# Patient Record
Sex: MALE | Race: BLACK OR AFRICAN AMERICAN | Employment: UNEMPLOYED | URBAN - METROPOLITAN AREA
[De-identification: names, ages, dates, MRNs, and addresses within clinical notes are randomized per-mention and may not be internally consistent; named-entity substitution may affect disease eponyms.]

---

## 2021-01-10 ENCOUNTER — HOSPITAL ENCOUNTER (EMERGENCY)
Age: 56
Discharge: HOME | End: 2021-01-10
Attending: EMERGENCY MEDICINE
Payer: MEDICAID

## 2021-01-10 ENCOUNTER — APPOINTMENT (OUTPATIENT)
Dept: CT IMAGING | Age: 56
End: 2021-01-10
Attending: NURSE PRACTITIONER
Payer: MEDICAID

## 2021-01-10 VITALS
BODY MASS INDEX: 25.76 KG/M2 | DIASTOLIC BLOOD PRESSURE: 90 MMHG | OXYGEN SATURATION: 100 % | SYSTOLIC BLOOD PRESSURE: 138 MMHG | WEIGHT: 170 LBS | TEMPERATURE: 98.9 F | HEART RATE: 88 BPM | HEIGHT: 68 IN | RESPIRATION RATE: 16 BRPM

## 2021-01-10 DIAGNOSIS — Z72.0 TOBACCO USE: ICD-10-CM

## 2021-01-10 DIAGNOSIS — K12.2 CELLULITIS OF SUBMANDIBULAR REGION: ICD-10-CM

## 2021-01-10 DIAGNOSIS — R03.0 ELEVATED BLOOD PRESSURE READING: Primary | ICD-10-CM

## 2021-01-10 LAB
ALBUMIN SERPL-MCNC: 3.4 G/DL (ref 3.5–5)
ALBUMIN/GLOB SERPL: 0.9 {RATIO} (ref 1.1–2.2)
ALP SERPL-CCNC: 93 U/L (ref 45–117)
ALT SERPL-CCNC: 13 U/L (ref 12–78)
ANION GAP SERPL CALC-SCNC: 4 MMOL/L (ref 5–15)
AST SERPL-CCNC: 13 U/L (ref 15–37)
BASOPHILS # BLD: 0 K/UL (ref 0–0.1)
BASOPHILS NFR BLD: 0 % (ref 0–1)
BILIRUB SERPL-MCNC: 0.2 MG/DL (ref 0.2–1)
BUN SERPL-MCNC: 16 MG/DL (ref 6–20)
BUN/CREAT SERPL: 12 (ref 12–20)
CALCIUM SERPL-MCNC: 8.1 MG/DL (ref 8.5–10.1)
CHLORIDE SERPL-SCNC: 106 MMOL/L (ref 97–108)
CO2 SERPL-SCNC: 30 MMOL/L (ref 21–32)
CREAT SERPL-MCNC: 1.35 MG/DL (ref 0.7–1.3)
DIFFERENTIAL METHOD BLD: ABNORMAL
EOSINOPHIL # BLD: 0.2 K/UL (ref 0–0.4)
EOSINOPHIL NFR BLD: 1 % (ref 0–7)
ERYTHROCYTE [DISTWIDTH] IN BLOOD BY AUTOMATED COUNT: 13.7 % (ref 11.5–14.5)
GLOBULIN SER CALC-MCNC: 3.7 G/DL (ref 2–4)
GLUCOSE SERPL-MCNC: 91 MG/DL (ref 65–100)
HCT VFR BLD AUTO: 43.9 % (ref 36.6–50.3)
HGB BLD-MCNC: 14.9 G/DL (ref 12.1–17)
IMM GRANULOCYTES # BLD AUTO: 0 K/UL (ref 0–0.04)
IMM GRANULOCYTES NFR BLD AUTO: 0 % (ref 0–0.5)
LYMPHOCYTES # BLD: 2.8 K/UL (ref 0.8–3.5)
LYMPHOCYTES NFR BLD: 24 % (ref 12–49)
MCH RBC QN AUTO: 31.5 PG (ref 26–34)
MCHC RBC AUTO-ENTMCNC: 33.9 G/DL (ref 30–36.5)
MCV RBC AUTO: 92.8 FL (ref 80–99)
MONOCYTES # BLD: 0.6 K/UL (ref 0–1)
MONOCYTES NFR BLD: 5 % (ref 5–13)
NEUTS SEG # BLD: 8.2 K/UL (ref 1.8–8)
NEUTS SEG NFR BLD: 70 % (ref 32–75)
NRBC # BLD: 0 K/UL (ref 0–0.01)
NRBC BLD-RTO: 0 PER 100 WBC
PLATELET # BLD AUTO: 233 K/UL (ref 150–400)
PMV BLD AUTO: 10.3 FL (ref 8.9–12.9)
POTASSIUM SERPL-SCNC: 3.9 MMOL/L (ref 3.5–5.1)
PROT SERPL-MCNC: 7.1 G/DL (ref 6.4–8.2)
RBC # BLD AUTO: 4.73 M/UL (ref 4.1–5.7)
SODIUM SERPL-SCNC: 140 MMOL/L (ref 136–145)
WBC # BLD AUTO: 11.8 K/UL (ref 4.1–11.1)

## 2021-01-10 PROCEDURE — 80053 COMPREHEN METABOLIC PANEL: CPT

## 2021-01-10 PROCEDURE — 74011250636 HC RX REV CODE- 250/636: Performed by: NURSE PRACTITIONER

## 2021-01-10 PROCEDURE — 74011000636 HC RX REV CODE- 636: Performed by: EMERGENCY MEDICINE

## 2021-01-10 PROCEDURE — 99283 EMERGENCY DEPT VISIT LOW MDM: CPT

## 2021-01-10 PROCEDURE — 85025 COMPLETE CBC W/AUTO DIFF WBC: CPT

## 2021-01-10 PROCEDURE — 70491 CT SOFT TISSUE NECK W/DYE: CPT

## 2021-01-10 PROCEDURE — 36415 COLL VENOUS BLD VENIPUNCTURE: CPT

## 2021-01-10 RX ORDER — CLINDAMYCIN HYDROCHLORIDE 300 MG/1
300 CAPSULE ORAL 4 TIMES DAILY
Qty: 28 CAP | Refills: 0 | Status: SHIPPED | OUTPATIENT
Start: 2021-01-10 | End: 2021-01-17

## 2021-01-10 RX ADMIN — SODIUM CHLORIDE 1000 ML: 9 INJECTION, SOLUTION INTRAVENOUS at 11:51

## 2021-01-10 RX ADMIN — IOPAMIDOL 100 ML: 612 INJECTION, SOLUTION INTRAVENOUS at 11:46

## 2021-01-10 NOTE — ED PROVIDER NOTES
EMERGENCY DEPARTMENT HISTORY AND PHYSICAL EXAM      Date: 1/10/2021  Patient Name: Phani Benjamin    History of Presenting Illness     Chief Complaint   Patient presents with    Facial Swelling     left side several days       History Provided By: Patient      Additional History (Context): Phani Benjamin is a 48 y.o. male with No significant past medical history who presents with facial swelling. Onset greater than 2 weeks ago. Reports pain starting on the left jaw that has worsened within the last week. Reports swelling has gradually increased. Patient denies fever, chills. Reports history of teeth extraction years ago however no complaints of gum or dental pain. Patient is a smoker. Denies neck pain, throat pain, difficulty swallowing. Patient states at times he has pain when opening his mouth. Patient has been able to tolerate oral intake. PCP: None        Past History     Past Medical History:  Past Medical History:   Diagnosis Date    Depression     Psychosis ([facility] [address]        Past Surgical History:  History reviewed. No pertinent surgical history. Family History:  History reviewed. No pertinent family history. Social History:  Social History     Tobacco Use    Smoking status: Current Every Day Smoker    Smokeless tobacco: Never Used   Substance Use Topics    Alcohol use: Not Currently    Drug use: Yes     Types: Marijuana       Allergies: Allergies   Allergen Reactions    Other Food Hives and Itching     [place] seasoning         Review of Systems   Review of Systems   Constitutional: Negative for chills and fever. HENT: Positive for facial swelling. Negative for congestion, ear pain, rhinorrhea, sore throat, trouble swallowing and voice change. + Jaw pain   Respiratory: Negative for cough and shortness of breath. Cardiovascular: Negative for chest pain and leg swelling. Gastrointestinal: Negative for abdominal pain, constipation, diarrhea, nausea and vomiting. Genitourinary: Negative for dysuria, frequency and urgency. Musculoskeletal: Negative for back pain, gait problem and neck pain. Skin: Negative for wound. Neurological: Negative for dizziness, numbness and headaches. All other systems reviewed and are negative. Physical Exam     Vitals:    01/10/21 1151 01/10/21 1152 01/10/21 1200 01/10/21 1237   BP: (!) 147/85  (!) 138/90    Pulse:       Resp:       Temp:       SpO2:  98% 94% 100%   Weight:       Height:         Physical Exam  Vitals signs and nursing note reviewed. Constitutional:       General: He is not in acute distress. Appearance: He is well-developed. He is not ill-appearing. HENT:      Head: Normocephalic and atraumatic. Comments: Left submandibular swelling with palpable nonmobile mass that is tender to touch     Right Ear: Tympanic membrane, ear canal and external ear normal.      Left Ear: Tympanic membrane, ear canal and external ear normal.      Nose: Nose normal.      Comments: + trismus     Mouth/Throat:      Mouth: Mucous membranes are moist.      Dentition: Abnormal dentition (Missing teeth noted to left lower gum). Pharynx: Oropharynx is clear. No oropharyngeal exudate. Eyes:      Extraocular Movements: Extraocular movements intact. Conjunctiva/sclera: Conjunctivae normal.      Pupils: Pupils are equal, round, and reactive to light. Neck:      Musculoskeletal: Normal range of motion and neck supple. Cardiovascular:      Rate and Rhythm: Normal rate and regular rhythm. Pulses: Normal pulses. Heart sounds: Normal heart sounds. Pulmonary:      Effort: Pulmonary effort is normal.      Breath sounds: Normal breath sounds. Abdominal:      General: Bowel sounds are normal. There is no distension. Palpations: Abdomen is soft. Tenderness: There is no abdominal tenderness. There is no guarding or rebound. Musculoskeletal: Normal range of motion.    Lymphadenopathy:      Cervical: No cervical adenopathy. Skin:     General: Skin is warm and dry. Neurological:      Mental Status: He is alert and oriented to person, place, and time. GCS: GCS eye subscore is 4. GCS verbal subscore is 5. GCS motor subscore is 6. Cranial Nerves: No cranial nerve deficit. Psychiatric:         Thought Content: Thought content normal.           Diagnostic Study Results     Labs -     Recent Results (from the past 12 hour(s))   METABOLIC PANEL, COMPREHENSIVE    Collection Time: 01/10/21 10:39 AM   Result Value Ref Range    Sodium 140 136 - 145 mmol/L    Potassium 3.9 3.5 - 5.1 mmol/L    Chloride 106 97 - 108 mmol/L    CO2 30 21 - 32 mmol/L    Anion gap 4 (L) 5 - 15 mmol/L    Glucose 91 65 - 100 mg/dL    BUN 16 6 - 20 MG/DL    Creatinine 1.35 (H) 0.70 - 1.30 MG/DL    BUN/Creatinine ratio 12 12 - 20      GFR est AA >60 >60 ml/min/1.73m2    GFR est non-AA 55 (L) >60 ml/min/1.73m2    Calcium 8.1 (L) 8.5 - 10.1 MG/DL    Bilirubin, total 0.2 0.2 - 1.0 MG/DL    ALT (SGPT) 13 12 - 78 U/L    AST (SGOT) 13 (L) 15 - 37 U/L    Alk. phosphatase 93 45 - 117 U/L    Protein, total 7.1 6.4 - 8.2 g/dL    Albumin 3.4 (L) 3.5 - 5.0 g/dL    Globulin 3.7 2.0 - 4.0 g/dL    A-G Ratio 0.9 (L) 1.1 - 2.2     CBC WITH AUTOMATED DIFF    Collection Time: 01/10/21 10:39 AM   Result Value Ref Range    WBC 11.8 (H) 4.1 - 11.1 K/uL    RBC 4.73 4.10 - 5.70 M/uL    HGB 14.9 12.1 - 17.0 g/dL    HCT 43.9 36.6 - 50.3 %    MCV 92.8 80.0 - 99.0 FL    MCH 31.5 26.0 - 34.0 PG    MCHC 33.9 30.0 - 36.5 g/dL    RDW 13.7 11.5 - 14.5 %    PLATELET 453 915 - 438 K/uL    MPV 10.3 8.9 - 12.9 FL    NRBC 0.0 0  WBC    ABSOLUTE NRBC 0.00 0.00 - 0.01 K/uL    NEUTROPHILS 70 32 - 75 %    LYMPHOCYTES 24 12 - 49 %    MONOCYTES 5 5 - 13 %    EOSINOPHILS 1 0 - 7 %    BASOPHILS 0 0 - 1 %    IMMATURE GRANULOCYTES 0 0.0 - 0.5 %    ABS. NEUTROPHILS 8.2 (H) 1.8 - 8.0 K/UL    ABS. LYMPHOCYTES 2.8 0.8 - 3.5 K/UL    ABS. MONOCYTES 0.6 0.0 - 1.0 K/UL    ABS. EOSINOPHILS 0.2 0.0 - 0.4 K/UL    ABS. BASOPHILS 0.0 0.0 - 0.1 K/UL    ABS. IMM. GRANS. 0.0 0.00 - 0.04 K/UL    DF AUTOMATED         Radiologic Studies -   CT NECK SOFT TISSUE W CONT   Final Result   IMPRESSION: Soft tissue mass subjacent to left nipple immediately superior to   left submandibular gland suspicious for adenopathy with eccentric necrotic   component and surrounding soft tissue edema and swelling may reflect adenopathy   or neoplasm with extensive inflammation in the adjacent soft tissues favoring   infectious process. Follow-up recommended if patient clinically improves with   therapy. CT Results  (Last 48 hours)               01/10/21 1146  CT NECK SOFT TISSUE W CONT Final result    Impression:  IMPRESSION: Soft tissue mass subjacent to left nipple immediately superior to   left submandibular gland suspicious for adenopathy with eccentric necrotic   component and surrounding soft tissue edema and swelling may reflect adenopathy   or neoplasm with extensive inflammation in the adjacent soft tissues favoring   infectious process. Follow-up recommended if patient clinically improves with   therapy. Narrative:  EXAM: CT NECK SOFT TISSUE W CONT       INDICATION: L submandibular pain and swelling > 2 weeks ; evaluate mass       COMPARISON: None. CONTRAST: 100 mL of Isovue-300. TECHNIQUE: Multislice helical CT was performed from the mid calvarium to the   aortic arch during uneventful rapid bolus intravenous contrast administration. Contiguous 2.5 mm axial images were reconstructed and lung and soft tissue   windows were generated. Coronal and sagittal reformations were generated. CT   dose reduction was achieved through use of a standardized protocol tailored for   this examination and automatic exposure control for dose modulation.         FINDINGS:       Immediately superior to the left seventh fibular gland there is a 1.8 x 2.4 x   2.3 cm enhancing soft tissue mass with small internal eccentric fluid collection   internally which lies immediately adjacent to the investing fascia. In the soft   tissues surrounding this there is diffuse inflammatory stranding and swelling. There is no other mass or adenopathy identified within the neck. The carotid and jugular vessels enhance normally. The thyroid gland, submandibular salivary glands and parotid glands are   otherwise normal.       No nasopharyngeal, pharyngeal or laryngeal mass is identified. No abnormalities are identified in the visualized portions of the brain or   orbits. The visualized mastoid air cells and paranasal sinuses are clear. The visualized portions of the lung apices are clear. CXR Results  (Last 48 hours)    None            Medical Decision Making   I am the first provider for this patient. I reviewed the vital signs, available nursing notes, past medical history, past surgical history, family history and social history. Vital Signs-Reviewed the patient's vital signs. Records Reviewed: Nursing Notes and Old Medical Records    41-year-old male with complaints of facial swelling exhibiting left submandibular swelling and tenderness with palpable mass. Patient is afebrile. Positive trismus noted on exam.  Patient is able to tolerate secretions. Plan obtain labs for CT of neck to rule out mass or cyst.    The patient was counseled on the dangers of tobacco use, and was advised to quit. Reviewed strategies to maximize success, including removing cigarettes and smoking materials from environment, stress management and support of family/friends     . ED Course:   ED Course as of Manan 10 1259   Sun Manan 10, 2021   1226 CT shows soft tissue mass suspicious for adenopathy with eccentric necrotic  component and surrounding soft tissue edema and swelling may reflect adenopathy or neoplasm with extensive inflammation in the adjacent soft tissues favoring  infectious process. Mild leukocytosis with WBC 11.8. Patient is afebrile. Discussed imaging results and labs with attending Dr. Iman Sorenson whom agrees with plan which is to have patient follow-up with ENT and DC with p.o. antibiotics. Rx clindamycin. Discussed results with patient and he verbalized understanding. CT NECK SOFT TISSUE W CONT [NA]      ED Course User Index  [NA] Roxana Sanabria NP         Disposition:  Discharge     DISCHARGE NOTE:     Pt has been reexamined. Patient has no new complaints, changes, or physical findings. Care plan outlined and precautions discussed. . All of pt's questions and concerns were addressed. Patient was instructed and agrees to follow up with ENT as well as to return to the ED upon further deterioration. Patient is ready to go home. Follow-up Information     Follow up With Specialties Details Why [facility] Otolaryngology Call in 1 day [address] [address] [phone]          Discharge Medication List as of 1/10/2021 12:26 PM      START taking these medications    Details   clindamycin (CLEOCIN) 300 mg capsule Take 1 Cap by mouth four (4) times daily for 7 days. , Normal, Disp-28 Cap, R-0             Provider Notes (Medical Decision Making):   DDx: Abscess versus versus tumor versus wilfred angina  Procedures:  Procedures    Diagnosis     Clinical Impression:   1. Elevated blood pressure reading    2. Tobacco use    3.  Cellulitis of submandibular region

## 2021-01-10 NOTE — ED NOTES
Patient here with c/o jaw swelling. Patient reports swelling to left side jaw/lower face for about a month. Patient states symptoms worsened this last week until at its very worst this morning. Patient denies fevers, denies drainage of any sort. Patient states hx of multiple teeth pulled in left side of mouth. Emergency Department Nursing Plan of Care       The Nursing Plan of Care is developed from the Nursing assessment and Emergency Department Attending provider initial evaluation. The plan of care may be reviewed in the ED Provider note.     The Plan of Care was developed with the following considerations:   Patient / Family readiness to learn indicated by:verbalized understanding  Persons(s) to be included in education: patient  Barriers to Learning/Limitations:No    Signed     Mildred Dawson RN    1/10/2021   10:36 AM

## 2021-03-21 ENCOUNTER — HOSPITAL ENCOUNTER (EMERGENCY)
Age: 56
Discharge: HOME | End: 2021-03-22
Attending: EMERGENCY MEDICINE
Payer: MEDICAID

## 2021-03-21 ENCOUNTER — APPOINTMENT (OUTPATIENT)
Dept: CT IMAGING | Age: 56
End: 2021-03-21
Attending: NURSE PRACTITIONER
Payer: MEDICAID

## 2021-03-21 VITALS
WEIGHT: 176 LBS | SYSTOLIC BLOOD PRESSURE: 181 MMHG | HEIGHT: 67 IN | BODY MASS INDEX: 27.62 KG/M2 | TEMPERATURE: 98.4 F | RESPIRATION RATE: 16 BRPM | OXYGEN SATURATION: 100 % | DIASTOLIC BLOOD PRESSURE: 103 MMHG | HEART RATE: 80 BPM

## 2021-03-21 DIAGNOSIS — R03.0 ELEVATED BLOOD PRESSURE READING: ICD-10-CM

## 2021-03-21 DIAGNOSIS — R22.0 MASS OF LEFT SUBMANDIBULAR REGION: Primary | ICD-10-CM

## 2021-03-21 LAB
ALBUMIN SERPL-MCNC: 3.4 G/DL (ref 3.5–5)
ALBUMIN/GLOB SERPL: 0.9 {RATIO} (ref 1.1–2.2)
ALP SERPL-CCNC: 85 U/L (ref 45–117)
ALT SERPL-CCNC: 11 U/L (ref 12–78)
ANION GAP SERPL CALC-SCNC: 4 MMOL/L (ref 5–15)
AST SERPL-CCNC: 12 U/L (ref 15–37)
BASOPHILS # BLD: 0 K/UL (ref 0–0.1)
BASOPHILS NFR BLD: 0 % (ref 0–1)
BILIRUB SERPL-MCNC: 0.3 MG/DL (ref 0.2–1)
BUN SERPL-MCNC: 17 MG/DL (ref 6–20)
BUN/CREAT SERPL: 9 (ref 12–20)
CALCIUM SERPL-MCNC: 8.7 MG/DL (ref 8.5–10.1)
CHLORIDE SERPL-SCNC: 107 MMOL/L (ref 97–108)
CO2 SERPL-SCNC: 30 MMOL/L (ref 21–32)
CREAT SERPL-MCNC: 1.92 MG/DL (ref 0.7–1.3)
DIFFERENTIAL METHOD BLD: ABNORMAL
EOSINOPHIL # BLD: 0.1 K/UL (ref 0–0.4)
EOSINOPHIL NFR BLD: 1 % (ref 0–7)
ERYTHROCYTE [DISTWIDTH] IN BLOOD BY AUTOMATED COUNT: 14 % (ref 11.5–14.5)
GLOBULIN SER CALC-MCNC: 3.8 G/DL (ref 2–4)
GLUCOSE SERPL-MCNC: 95 MG/DL (ref 65–100)
HCT VFR BLD AUTO: 45.5 % (ref 36.6–50.3)
HGB BLD-MCNC: 15.3 G/DL (ref 12.1–17)
IMM GRANULOCYTES # BLD AUTO: 0.1 K/UL (ref 0–0.04)
IMM GRANULOCYTES NFR BLD AUTO: 1 % (ref 0–0.5)
LYMPHOCYTES # BLD: 3 K/UL (ref 0.8–3.5)
LYMPHOCYTES NFR BLD: 28 % (ref 12–49)
MCH RBC QN AUTO: 31 PG (ref 26–34)
MCHC RBC AUTO-ENTMCNC: 33.6 G/DL (ref 30–36.5)
MCV RBC AUTO: 92.3 FL (ref 80–99)
MONOCYTES # BLD: 0.7 K/UL (ref 0–1)
MONOCYTES NFR BLD: 7 % (ref 5–13)
NEUTS SEG # BLD: 6.8 K/UL (ref 1.8–8)
NEUTS SEG NFR BLD: 63 % (ref 32–75)
NRBC # BLD: 0 K/UL (ref 0–0.01)
NRBC BLD-RTO: 0 PER 100 WBC
PLATELET # BLD AUTO: 242 K/UL (ref 150–400)
PMV BLD AUTO: 9.3 FL (ref 8.9–12.9)
POTASSIUM SERPL-SCNC: 4 MMOL/L (ref 3.5–5.1)
PROT SERPL-MCNC: 7.2 G/DL (ref 6.4–8.2)
RBC # BLD AUTO: 4.93 M/UL (ref 4.1–5.7)
SODIUM SERPL-SCNC: 141 MMOL/L (ref 136–145)
WBC # BLD AUTO: 10.7 K/UL (ref 4.1–11.1)

## 2021-03-21 PROCEDURE — 85025 COMPLETE CBC W/AUTO DIFF WBC: CPT

## 2021-03-21 PROCEDURE — 99282 EMERGENCY DEPT VISIT SF MDM: CPT

## 2021-03-21 PROCEDURE — 70491 CT SOFT TISSUE NECK W/DYE: CPT

## 2021-03-21 PROCEDURE — 80053 COMPREHEN METABOLIC PANEL: CPT

## 2021-03-21 PROCEDURE — 36415 COLL VENOUS BLD VENIPUNCTURE: CPT

## 2021-03-21 PROCEDURE — 74011000636 HC RX REV CODE- 636: Performed by: EMERGENCY MEDICINE

## 2021-03-21 RX ORDER — TRAZODONE HYDROCHLORIDE 50 MG/1
TABLET ORAL
COMMUNITY

## 2021-03-21 RX ORDER — SODIUM CHLORIDE 0.9 % (FLUSH) 0.9 %
10 SYRINGE (ML) INJECTION
Status: COMPLETED | OUTPATIENT
Start: 2021-03-21 | End: 2021-03-21

## 2021-03-21 RX ADMIN — IOPAMIDOL 70 ML: 612 INJECTION, SOLUTION INTRAVENOUS at 23:31

## 2021-03-21 RX ADMIN — Medication 10 ML: at 23:31

## 2021-03-22 RX ORDER — CLONIDINE HYDROCHLORIDE 0.1 MG/1
0.2 TABLET ORAL
Status: DISCONTINUED | OUTPATIENT
Start: 2021-03-22 | End: 2021-03-22

## 2021-03-22 RX ORDER — AMLODIPINE BESYLATE 5 MG/1
5 TABLET ORAL DAILY
Qty: 30 TAB | Refills: 0 | Status: SHIPPED | OUTPATIENT
Start: 2021-03-22

## 2021-03-22 NOTE — ED TRIAGE NOTES
Patient presents to the ED with c/o left sided jaw swelling x1 month. Pt reports being seen and was given an antibiotic. Pt denies any resolution with the medications.

## 2021-03-22 NOTE — ED NOTES
To Hou from radiology reports that pt kidney function has worsened since last visit and he will need to hydrate after CT w/ contrast. Amount of contrast for CT will be decreased

## 2021-03-22 NOTE — ED NOTES
Pt presents with L submandibular swelling/ nodule x 1 month. Pt reports he was seen at [facility] ED 1/10/2021 for the same reason and was prescribed an antibiotic- the swelling did not resolve. Pt reports the nodule has increased in size since his last visit, from \"pea size\" to \"walnut size. \" Pt denies pain when eating or drinking. Alert and oriented x 4. Skin warm, dry, intact. Ambulates independently. Call bell within reach. Emergency Department Nursing Plan of Care       The Nursing Plan of Care is developed from the Nursing assessment and Emergency Department Attending provider initial evaluation. The plan of care may be reviewed in the ED Provider note.     The Plan of Care was developed with the following considerations:   Patient / Family readiness to learn indicated by:verbalized understanding  Persons(s) to be included in education: patient  Barriers to Learning/Limitations:No    Signed     Rebecca Dotson RN    3/21/2021   10:34 PM

## 2021-03-22 NOTE — ED PROVIDER NOTES
EMERGENCY DEPARTMENT HISTORY AND PHYSICAL EXAM    Date: 3/21/2021  Patient Name: Robson Miller    History of Presenting Illness     Chief Complaint   Patient presents with    Jaw Swelling         History Provided By: Patient    Chief Complaint: skin problem  Duration: onset in January   Timing:  Gradual and Worsening  Location: sub mandible left side  Quality: Pressure  Severity: 7 out of 10  Modifying Factors: none  Associated Symptoms: lesion sub mandible       HPI: Robson Miller is a 48 y.o. male with a PMH of No significant past medical history who presents with submandibular lesion onset in January. Patient was evaluated this emergency department on chart review noted patient was treated for cellulitis given clindamycin which he completed. Patient states lesion started out very small and has increased in size since his last visit. Patient did not follow-up with ENT. PCP: None    Current Outpatient Medications   Medication Sig Dispense Refill    amLODIPine (NORVASC) 5 mg tablet Take 1 Tab by mouth daily. 30 Tab 0    traZODone (DESYREL) 50 mg tablet Take  by mouth nightly. Past History     Past Medical History:  Past Medical History:   Diagnosis Date    Depression     Psychosis [facility])        Past Surgical History:  History reviewed. No pertinent surgical history. Family History:  History reviewed. No pertinent family history. Social History:  Social History     Tobacco Use    Smoking status: Current Every Day Smoker     Packs/day: 0.50    Smokeless tobacco: Never Used   Substance Use Topics    Alcohol use: Yes     Comment: occasionally    Drug use: Yes     Types: Marijuana       Allergies: Allergies   Allergen Reactions    Other Food Hives and Itching     [place] seasoning         Review of Systems   Review of Systems   Constitutional: Negative for chills, fatigue and fever. HENT: Negative for congestion and sore throat. Eyes: Negative for redness.    Respiratory: Negative for cough, chest tightness and wheezing. Cardiovascular: Negative for chest pain. Gastrointestinal: Negative for abdominal pain. Genitourinary: Negative for dysuria. Musculoskeletal: Negative for arthralgias, back pain, myalgias, neck pain and neck stiffness. Skin: Negative for rash. Lesion submandibular   Neurological: Negative for dizziness, syncope, weakness, light-headedness, numbness and headaches. Hematological: Negative for adenopathy. Psychiatric/Behavioral: Negative for agitation and behavioral problems. All other systems reviewed and are negative. Physical Exam     Vitals:    03/21/21 2202 03/21/21 2357   BP: (!) 184/104 (!) 181/103   Pulse: 80    Resp: 16    Temp: 98.4 °F (36.9 °C)    SpO2: 100%    Weight: 79.8 kg (176 lb)    Height: 5' 7\" (1.702 m)      Physical Exam  Vitals signs and nursing note reviewed. Constitutional:       Appearance: He is well-developed. HENT:      Head: Normocephalic and atraumatic. Right Ear: External ear normal.   Eyes:      General:         Right eye: No discharge. Left eye: No discharge. Conjunctiva/sclera: Conjunctivae normal.   Neck:      Musculoskeletal: Normal range of motion and neck supple. Cardiovascular:      Rate and Rhythm: Normal rate and regular rhythm. Heart sounds: Normal heart sounds. Pulmonary:      Effort: Pulmonary effort is normal. No respiratory distress. Breath sounds: Normal breath sounds. No wheezing. Abdominal:      General: Bowel sounds are normal.      Palpations: Abdomen is soft. Tenderness: There is no abdominal tenderness. Musculoskeletal: Normal range of motion. Lymphadenopathy:      Cervical: No cervical adenopathy. Skin:     General: Skin is warm and dry. Neurological:      Mental Status: He is alert and oriented to person, place, and time. Cranial Nerves: No cranial nerve deficit.    Psychiatric:         Behavior: Behavior normal.         Thought Content: Thought content normal.         Judgment: Judgment normal.           Diagnostic Study Results     Labs -     No results found for this or any previous visit (from the past 12 hour(s)). Radiologic Studies -   CT NECK SOFT TISSUE W CONT   Final Result      1. New, subcutaneous but necrotic left submandibular mass 2.0 x 2.0 cm could be   a necrotic lymph node or abscess. 2. Slight decrease in size of separate soft tissue mass/lymph node adjacent to   the left submandibular gland. 3. Slight decrease in enlarged left jugulodigastric lymph node. CT Results  (Last 48 hours)               03/21/21 2329  CT NECK SOFT TISSUE W CONT Final result    Impression:      1. New, subcutaneous but necrotic left submandibular mass 2.0 x 2.0 cm could be   a necrotic lymph node or abscess. 2. Slight decrease in size of separate soft tissue mass/lymph node adjacent to   the left submandibular gland. 3. Slight decrease in enlarged left jugulodigastric lymph node. Narrative:  INDICATION: l submandibular mass compare with previous       COMPARISON: January 10, 2021       TECHNIQUE:  Axial neck CT was performed after the uneventful administration of   IV contrast. Sagittal and coronal reconstructions were generated. CT dose reduction was achieved through use of a standardized protocol tailored   for this examination and automatic exposure control for dose modulation. FINDINGS:       NASOPHARYNX: Normal.   SUPRAHYOID NECK: Normal oropharynx, oral cavity, parapharyngeal space, and   retropharyngeal space. INFRAHYOID NECK: Normal larynx, hypopharynx and supraglottis. PAROTID GLANDS: Normal.   SUBMANDIBULAR GLANDS: Normal right submandibular gland. Left sublingual gland   displaced posteriorly slightly, with an adjacent 1.9 x 1.6 cm mass, previously   1.8 x 2.4 cm.  However, there is a new, necrotic appearing 2.0 x 2.0 cm mass in   the left submandibular soft tissues inferior to the submandibular gland, with   associated soft tissue swelling and involvement of the left platysma muscle.   THYROID GLAND: Normal.   LYMPH NODES: Left jugulodigastric lymph node 1.6 x 1.1 cm previously 1.8 x 1.3   cm. Submental lymph nodes 1.3 and 1.0 cm, similar to prior study.   LUNG APICES: Emphysema.   OSSEOUS STRUCTURES: No destructive bone lesion.   ADDITIONAL COMMENTS: N/A.               CXR Results  (Last 48 hours)    None            Medical Decision Making   I am the first provider for this patient.    I reviewed the vital signs, available nursing notes, past medical history, past surgical history, family history and social history.    Vital Signs-Reviewed the patient's vital signs.    Records Reviewed: Nursing Notes    Provider Notes (Medical Decision Making):   DDX abscess versus cellulitis versus cyst versus tumor  5-year-old male presents to the emergency department with increased size of the lesion submandibular left side.  After evaluation in January treated for cellulitis states he did not get better.  Patient is now follow-up with ENT.  CT scan ordered.  ED Course as of Mar 22 1529   Mon Mar 22, 2021   0001 CT report pending .care of patient transferred to attending.    [AN]      ED Course User Index  [AN] Enedina Berry, NP          Disposition:  home    DISCHARGE NOTE:   Patient discharged by attending.  Follow-up Information     Follow up With Specialties Details Why Contact Info    [facility] In 1 week  [address]  [address]  [facility] [address] [phone]    Gorge Nolan MD Otolaryngology Schedule an appointment as soon as possible for a visit  regarding your left submandibular mass [address]  [address]  [facility] [address] [phone]      [facility] Otolaryngology  Schedule an appointment as soon as possible for a visit  regarding submandibular mass [address]   [address]  [facility] [address] [phone]    [facility] EMERGENCY  DEPT Emergency Medicine  As needed, If symptoms worsen [address]          Discharge Medication List as of 3/22/2021 12:16 AM      START taking these medications    Details   amLODIPine (NORVASC) 5 mg tablet Take 1 Tab by mouth daily. , Normal, Disp-30 Tab, R-0         CONTINUE these medications which have NOT CHANGED    Details   traZODone (DESYREL) 50 mg tablet Take  by mouth nightly., Historical Med             Procedures:  Procedures    Please note that this dictation was completed with Dragon, computer voice recognition software. Quite often unanticipated grammatical, syntax, homophones, and other interpretive errors are inadvertently transcribed by the computer software. Please disregard these errors. Additionally, please excuse any errors that have escaped final proofreading. Diagnosis     Clinical Impression:   1. Mass of left submandibular region    2.  Elevated blood pressure reading

## 2021-04-06 ENCOUNTER — TRANSCRIBE ORDER (OUTPATIENT)
Dept: SCHEDULING | Age: 56
End: 2021-04-06

## 2021-04-06 DIAGNOSIS — R22.1 NECK MASS: Primary | ICD-10-CM

## 2021-04-06 DIAGNOSIS — F17.200 SMOKER: ICD-10-CM

## 2021-04-06 DIAGNOSIS — Z00.8 OTHER SPECIFIED GENERAL MEDICAL EXAMINATION: ICD-10-CM

## 2021-04-14 ENCOUNTER — HOSPITAL ENCOUNTER (OUTPATIENT)
Dept: ULTRASOUND IMAGING | Age: 56
Discharge: HOME | End: 2021-04-14
Attending: OTOLARYNGOLOGY
Payer: MEDICAID

## 2021-04-14 DIAGNOSIS — F17.200 SMOKER: ICD-10-CM

## 2021-04-14 DIAGNOSIS — R22.1 NECK MASS: ICD-10-CM

## 2021-04-14 DIAGNOSIS — Z00.8 OTHER SPECIFIED GENERAL MEDICAL EXAMINATION: ICD-10-CM

## 2021-04-14 PROCEDURE — 88305 TISSUE EXAM BY PATHOLOGIST: CPT

## 2021-04-14 PROCEDURE — 87116 MYCOBACTERIA CULTURE: CPT

## 2021-04-14 PROCEDURE — 88173 CYTOPATH EVAL FNA REPORT: CPT

## 2021-04-14 PROCEDURE — 87102 FUNGUS ISOLATION CULTURE: CPT

## 2021-04-14 PROCEDURE — 77030003666 HC NDL SPINAL BD -A

## 2021-04-14 PROCEDURE — 87205 SMEAR GRAM STAIN: CPT

## 2021-04-14 PROCEDURE — 77030040395 HC NDL BIOP COAX INTRO MRTM -B

## 2021-04-14 PROCEDURE — 77030014115

## 2021-04-14 PROCEDURE — 88172 CYTP DX EVAL FNA 1ST EA SITE: CPT

## 2021-04-14 PROCEDURE — 10005 FNA BX W/US GDN 1ST LES: CPT

## 2021-04-18 LAB
BACTERIA SPEC CULT: NORMAL
BACTERIA SPEC CULT: NORMAL
GRAM STN SPEC: NORMAL
GRAM STN SPEC: NORMAL
SERVICE CMNT-IMP: NORMAL

## 2021-05-17 LAB
BACTERIA SPEC CULT: NORMAL
SERVICE CMNT-IMP: NORMAL

## 2021-05-26 ENCOUNTER — TRANSCRIBE ORDER (OUTPATIENT)
Dept: SCHEDULING | Age: 56
End: 2021-05-26

## 2021-05-26 DIAGNOSIS — R22.1 NECK MASS: Primary | ICD-10-CM

## 2021-05-29 LAB
ACID FAST STN SPEC: NEGATIVE
MYCOBACTERIUM SPEC QL CULT: NEGATIVE
SPECIMEN PREPARATION: NORMAL
SPECIMEN SOURCE: NORMAL

## 2021-07-13 ENCOUNTER — HOSPITAL ENCOUNTER (EMERGENCY)
Age: 56
Discharge: HOME | End: 2021-07-14
Attending: EMERGENCY MEDICINE
Payer: MEDICAID

## 2021-07-13 ENCOUNTER — APPOINTMENT (OUTPATIENT)
Dept: GENERAL RADIOLOGY | Age: 56
End: 2021-07-13
Attending: EMERGENCY MEDICINE
Payer: MEDICAID

## 2021-07-13 VITALS
RESPIRATION RATE: 18 BRPM | OXYGEN SATURATION: 95 % | HEART RATE: 102 BPM | TEMPERATURE: 97.7 F | DIASTOLIC BLOOD PRESSURE: 73 MMHG | BODY MASS INDEX: 31.76 KG/M2 | SYSTOLIC BLOOD PRESSURE: 122 MMHG | HEIGHT: 64 IN | WEIGHT: 186 LBS

## 2021-07-13 DIAGNOSIS — J06.9 ACUTE UPPER RESPIRATORY INFECTION: Primary | ICD-10-CM

## 2021-07-13 DIAGNOSIS — Z72.0 TOBACCO ABUSE: ICD-10-CM

## 2021-07-13 DIAGNOSIS — J20.9 ACUTE BRONCHITIS, UNSPECIFIED ORGANISM: ICD-10-CM

## 2021-07-13 PROCEDURE — 71046 X-RAY EXAM CHEST 2 VIEWS: CPT

## 2021-07-13 PROCEDURE — 99283 EMERGENCY DEPT VISIT LOW MDM: CPT

## 2021-07-13 NOTE — LETTER
[address] EMERGENCY DEPT  [address] [facility] [id number] [phone]    Work/School Note    Date: 7/13/2021    To Whom It May concern:    Tony Dubon was seen and treated today in the emergency room by the following provider(s):  Attending Provider: Lashell Messina MD.      Tony Dubon may return to work on 7/15/21.     Sincerely,          Agustina Coleman MD

## 2021-07-14 PROCEDURE — 74011250637 HC RX REV CODE- 250/637: Performed by: EMERGENCY MEDICINE

## 2021-07-14 RX ORDER — GUAIFENESIN 600 MG/1
600 TABLET, EXTENDED RELEASE ORAL ONCE
Status: COMPLETED | OUTPATIENT
Start: 2021-07-14 | End: 2021-07-14

## 2021-07-14 RX ORDER — GUAIFENESIN 600 MG/1
600 TABLET, EXTENDED RELEASE ORAL 2 TIMES DAILY
Qty: 20 TABLET | Refills: 0 | Status: SHIPPED | OUTPATIENT
Start: 2021-07-14

## 2021-07-14 RX ORDER — BENZONATATE 100 MG/1
100 CAPSULE ORAL
Qty: 30 CAPSULE | Refills: 0 | Status: SHIPPED | OUTPATIENT
Start: 2021-07-14 | End: 2021-07-21

## 2021-07-14 RX ORDER — AZITHROMYCIN 500 MG/1
500 TABLET, FILM COATED ORAL
Status: COMPLETED | OUTPATIENT
Start: 2021-07-14 | End: 2021-07-14

## 2021-07-14 RX ORDER — ALBUTEROL SULFATE 90 UG/1
2 AEROSOL, METERED RESPIRATORY (INHALATION)
Qty: 1 INHALER | Refills: 0 | Status: SHIPPED | OUTPATIENT
Start: 2021-07-14

## 2021-07-14 RX ORDER — BENZONATATE 100 MG/1
200 CAPSULE ORAL ONCE
Status: COMPLETED | OUTPATIENT
Start: 2021-07-14 | End: 2021-07-14

## 2021-07-14 RX ORDER — AZITHROMYCIN 250 MG/1
TABLET, FILM COATED ORAL
Qty: 6 TABLET | Refills: 0 | Status: SHIPPED | OUTPATIENT
Start: 2021-07-14 | End: 2021-07-19

## 2021-07-14 RX ADMIN — BENZONATATE 200 MG: 100 CAPSULE ORAL at 00:20

## 2021-07-14 RX ADMIN — AZITHROMYCIN MONOHYDRATE 500 MG: 500 TABLET ORAL at 00:20

## 2021-07-14 RX ADMIN — GUAIFENESIN 600 MG: 600 TABLET, EXTENDED RELEASE ORAL at 00:20

## 2021-07-14 NOTE — ED PROVIDER NOTES
EMERGENCY DEPARTMENT HISTORY AND PHYSICAL EXAM      Please note that this dictation was completed with the assistance of \"Dragon\", the computer voice recognition software. Quite often unanticipated grammatical, syntax, homophones, and other interpretive errors are inadvertently transcribed by the computer software. Please disregard these errors and any errors that have escaped final proofreading. Thank you. Patient Name: Tony Dubon  : 1965  MRN: [id number]  History of Presenting Illness     Chief Complaint   Patient presents with    Cough     History Provided By: Patient    HPI: Tony Dubon, 55 y.o. male with past medical history as documented below presents to the ED with c/o of 3 week of productive cough and nasal congestion. Denies SOB or chest pain. No fevers. No sick contacts or exposure to COVID-19. Pt has been vaccinated. States he has been smoking a pack per day for the past several years and has had to cut back due to above sx's. States taking OTC meds without relief of sx's. Pt denies any other exacerbating or ameliorating factors. Additionally, pt specifically denies any recent fever, chills, headache, nausea, vomiting, abdominal pain, CP, SOB, lightheadedness, dizziness, numbness, weakness, lower extremity swelling, heart palpitations, urinary sxs, diarrhea, constipation, melena, hematochezia. There are no other complaints, changes or physical findings pertinent to the HPI at this time. PCP: None    Past History   Past Medical History:  Past Medical History:   Diagnosis Date    Depression     Psychosis ([facility] [address]        Past Surgical History:  Denies    Family History:  History reviewed. No pertinent family history.     Social History:  Social History     Tobacco Use    Smoking status: Current Every Day Smoker     Packs/day: 0.50    Smokeless tobacco: Never Used   Substance Use Topics    Alcohol use: Yes     Comment: occasionally    Drug use: Yes     Types: Marijuana Allergies: Allergies   Allergen Reactions    Other Food Hives and Itching     [place] seasoning       Current Medications:  No current facility-administered medications on file prior to encounter. Current Outpatient Medications on File Prior to Encounter   Medication Sig Dispense Refill    amLODIPine (NORVASC) 5 mg tablet Take 1 Tab by mouth daily. 30 Tab 0    traZODone (DESYREL) 50 mg tablet Take  by mouth nightly. Review of Systems   Review of Systems   Constitutional: Negative. Negative for chills and fever. HENT: Positive for congestion. Negative for sore throat. Eyes: Negative. Respiratory: Positive for cough. Negative for chest tightness, shortness of breath and wheezing. Cardiovascular: Negative. Negative for chest pain, palpitations and leg swelling. Gastrointestinal: Negative. Negative for abdominal distention, abdominal pain, blood in stool, constipation, diarrhea, nausea and vomiting. Endocrine: Negative. Genitourinary: Negative. Negative for dysuria, flank pain, frequency, hematuria and urgency. Musculoskeletal: Negative. Negative for arthralgias, back pain and myalgias. Skin: Negative. Negative for color change and rash. Neurological: Negative. Negative for dizziness, syncope, speech difficulty, weakness, light-headedness, numbness and headaches. Hematological: Negative. Psychiatric/Behavioral: Negative. Negative for confusion and self-injury. The patient is not nervous/anxious. All other systems reviewed and are negative. Physical Exam   Physical Exam  Vitals and nursing note reviewed. Constitutional:       Appearance: He is well-developed. He is not toxic-appearing. HENT:      Head: Normocephalic and atraumatic. Nose: Congestion present. Mouth/Throat:      Pharynx: No posterior oropharyngeal erythema.    Eyes:      Conjunctiva/sclera: Conjunctivae normal.   Cardiovascular:      Rate and Rhythm: Normal rate and regular rhythm. Heart sounds: Normal heart sounds. No murmur heard. No friction rub. No gallop. Pulmonary:      Effort: Pulmonary effort is normal. No respiratory distress. Breath sounds: Normal breath sounds. No wheezing or rales. Chest:      Chest wall: No tenderness. Abdominal:      General: Bowel sounds are normal. There is no distension. Palpations: Abdomen is soft. There is no mass. Tenderness: There is no abdominal tenderness. There is no guarding or rebound. Musculoskeletal:         General: Normal range of motion. Cervical back: Normal range of motion. Skin:     General: Skin is warm. Neurological:      General: No focal deficit present. Mental Status: He is alert and oriented to person, place, and time. Motor: No abnormal muscle tone. Psychiatric:         Behavior: Behavior is cooperative. Diagnostic Study Results     Labs -   I have personally reviewed and interpreted all available laboratory results. No results found for this or any previous visit (from the past 24 hour(s)). Radiologic Studies -   I have personally reviewed and interpreted all available imaging studies and agree with radiology interpretation and report. XR CHEST PA LAT   Final Result   Clear lungs. CT Results  (Last 48 hours)    None        CXR Results  (Last 48 hours)               07/13/21 6138  XR CHEST PA LAT Final result    Impression:  Clear lungs. Narrative:  PA AND LATERAL CHEST RADIOGRAPHS: 7/13/2021 11:29 PM       INDICATION: Cough for 3 weeks. COMPARISON: None. TECHNIQUE: Frontal and lateral radiographs of the chest.       FINDINGS:   The lungs are clear. The central airways are patent. The heart size is normal.   No pneumothorax or pleural effusion. Medical Decision Making   I reviewed the vital signs, available nursing notes, past medical history, past surgical history, family history and social history.     Vital Signs-Reviewed the patient's vital signs. Patient Vitals for the past 24 hrs:   Temp Pulse Resp BP SpO2   07/13/21 2222 97.7 °F (36.5 °C) (!) 102 18 122/73 95 %     Pulse Oximetry Analysis - 95% on RA    Cardiac Monitor:   Rate: 99 bpm  The cardiac monitor revealed the following rhythm as interpreted by me: Normal Sinus Rhythm      Records Reviewed: Nursing Notes, Old Medical Records, Previous electrocardiograms, Previous Radiology Studies and Previous Laboratory Studies    Provider Notes (Medical Decision Making):   Pt presents with acute URI symptoms including nasal congestion, rhinorrhea. Pt also has c/o of cough without dyspnea, chest pain or wheezing. Pt is well-appearing with stable vitals and benign exam; symptoms are consistent with an uncomplicated URI. DDx: COVID-19, acute bronchitis, bacterial sinusitis vs. pharyngitis, migraine, flu. Symptomatic therapy suggested: acetaminophen, ibuprofen, antihistamine-decongestant of choice, cough suppressant of choice. Increase fluids, use vaporizer, stay in steamy bathroom tid 15 min prn severe cough, tylenol as needed, rest, avoid smoky areas. Lack of antibiotic effectiveness discussed with her. Symptomatic therapy suggested: gargle for sore throat, use mist at bedside for congestion. Apply facial warm packs for sinus pain or use nasal saline sprays. Follow up prn if not better in 72 hours. ED Course:   I am the first provider for this patient's ED visit today. Initial assessment performed. I discussed presenting problems, concerns and my formulated plan for today's visit with the patient and any available family members at bedside. I encouraged them to ask questions as they arise throughout the visit.      Social History     Tobacco Use    Smoking status: Current Every Day Smoker     Packs/day: 0.50    Smokeless tobacco: Never Used   Substance Use Topics    Alcohol use: Yes     Comment: occasionally    Drug use: Yes     Types: Marijuana     TOBACCO COUNSELING:  Upon evaluation, pt expressed that they are a current tobacco user. For approximately 3-5 mins, pt has been counseled on the dangers of smoking and was encouraged to quit as soon as possible in order to decrease further risks to their health. Pt has conveyed their understanding of the risks involved should they continue to use tobacco products. I reviewed our electronic medical record system for any past medical records that were available that may contribute to the patient's current condition, the nursing notes and vital signs from today's visit. ED Orders Placed :  Orders Placed This Encounter    XR CHEST PA LAT    azithromycin (ZITHROMAX) tablet 500 mg    guaiFENesin ER (MUCINEX) tablet 600 mg    benzonatate (TESSALON) capsule 200 mg    azithromycin (Zithromax Z-Rosendo) 250 mg tablet    guaiFENesin ER (Mucinex) 600 mg ER tablet    benzonatate (Tessalon Perles) 100 mg capsule    albuterol (PROVENTIL HFA, VENTOLIN HFA, PROAIR HFA) 90 mcg/actuation inhaler       ED Medications Administered:  Medications   azithromycin (ZITHROMAX) tablet 500 mg (500 mg Oral Given 7/14/21 0020)   guaiFENesin ER (MUCINEX) tablet 600 mg (600 mg Oral Given 7/14/21 0020)   benzonatate (TESSALON) capsule 200 mg (200 mg Oral Given 7/14/21 0020)        Progress Note:  Patient has been reassessed and reports feeling better and symptoms have improved significantly after ED treatment. Manuel Rangel's final labs and imaging have been reviewed with him and available family and/or caregiver. They have been counseled regarding his diagnosis. He verbally conveys understanding and agreement of the signs, symptoms, diagnosis, treatment and prognosis and additionally agrees to follow up as recommended with Dr. None and/or specialist in 24 - 48 hours. He also agrees with the care-plan we created together and conveys that all of his questions have been answered.   I have also put together a packet of discharge instructions for him that include: 1) educational information regarding their diagnosis, 2) how to care for their diagnosis at home, as well a 3) list of reasons why they would want to return to the ED prior to their follow-up appointment should the patient's condition change or symptoms worsen. I have answered all questions to the patient's satisfaction. Strict return precautions given. He both understood and agreed with plan as discussed. Vital signs stable for discharge. Disposition:   DISCHARGE  The pt is ready for discharge. The pt's signs, symptoms, diagnosis, and discharge instructions have been discussed and pt has conveyed their understanding. The pt is to follow up as recommended or return to ER should their symptoms worsen. Plan has been discussed and pt is in agreement. Plan:  1. Return precautions as discussed with patient and available family and/or caregiver. 2.   Discharge Medication List as of 7/14/2021 12:34 AM      START taking these medications    Details   azithromycin (Zithromax Z-Rosendo) 250 mg tablet Take 2 tablets (500mg) on day 1, and then 1 tablet (250mg) daily for days 2-5., Normal, Disp-6 Tablet, R-0      guaiFENesin ER (Mucinex) 600 mg ER tablet Take 1 Tablet by mouth two (2) times a day., Normal, Disp-20 Tablet, R-0      benzonatate (Tessalon Perles) 100 mg capsule Take 1 Capsule by mouth three (3) times daily as needed for Cough for up to 7 days. , Normal, Disp-30 Capsule, R-0      albuterol (PROVENTIL HFA, VENTOLIN HFA, PROAIR HFA) 90 mcg/actuation inhaler Take 2 Puffs by inhalation every six (6) hours as needed for Wheezing., Normal, Disp-1 Inhaler, R-0         CONTINUE these medications which have NOT CHANGED    Details   amLODIPine (NORVASC) 5 mg tablet Take 1 Tab by mouth daily. , Normal, Disp-30 Tab, R-0      traZODone (DESYREL) 50 mg tablet Take  by mouth nightly., Historical Med           3.    Follow-up Information     Follow up With Specialties Details Why Contact Info    [email or web address] EMERGENCY DEPT Emergency Medicine  As needed, If symptoms worsen Abner Finnegan          Instructed to return to ED if worse  Diagnosis   Clinical Impression:  1. Acute upper respiratory infection    2. Acute bronchitis, unspecified organism    3. Tobacco abuse        Attestation:  Mariana Thomas MD, am the attending of record for this patient. I personally performed the services described in this documentation on this date, 7/13/2021 for patient, Terrance Caceres. I have reviewed the chart and verified that the record is accurate and complete.

## 2021-07-14 NOTE — DISCHARGE INSTRUCTIONS
It was a pleasure taking care of you in our Emergency Department today. We know that when you come to [facility], you are entrusting us with your health, comfort, and safety. Our physicians and nurses honor that trust, and truly appreciate the opportunity to care for you and your loved ones. We also value your feedback. If you receive a survey about your Emergency Department experience today, please fill it out. We care about our patients' feedback, and we listen to what you have to say. Thank you!

## 2021-07-14 NOTE — ED NOTES
Pt presents to the ED with c/o a productive cough and mucus in his chest x 3 weeks. Denies chest pain. Denies SOB. Denies fevers, body aches or chills. Reports taking several OTC medications for cough without relief. Pt reports smoking a pack of cigarettes a day x 4 years. Pt is alert, oriented and appropriate. Ambulatory on arrival. Lung sounds clear on assessment. Emergency Department Nursing Plan of Care       The Nursing Plan of Care is developed from the Nursing assessment and Emergency Department Attending provider initial evaluation. The plan of care may be reviewed in the ED Provider note.     The Plan of Care was developed with the following considerations:   Patient / Family readiness to learn indicated by:verbalized understanding  Persons(s) to be included in education: patient  Barriers to Learning/Limitations:No    Signed     Eleni Onofre    7/14/2021   12:00 AM

## 2021-07-27 ENCOUNTER — HOSPITAL ENCOUNTER (OUTPATIENT)
Dept: PREADMISSION TESTING | Age: 56
Discharge: HOME | End: 2021-07-27
Payer: MEDICAID

## 2021-07-27 ENCOUNTER — TRANSCRIBE ORDER (OUTPATIENT)
Dept: REGISTRATION | Age: 56
End: 2021-07-27

## 2021-07-27 VITALS
HEIGHT: 64 IN | WEIGHT: 191.14 LBS | HEART RATE: 85 BPM | TEMPERATURE: 98.9 F | BODY MASS INDEX: 32.63 KG/M2 | SYSTOLIC BLOOD PRESSURE: 154 MMHG | DIASTOLIC BLOOD PRESSURE: 79 MMHG

## 2021-07-27 DIAGNOSIS — Z01.812 PRE-PROCEDURE LAB EXAM: ICD-10-CM

## 2021-07-27 DIAGNOSIS — Z01.812 PRE-PROCEDURE LAB EXAM: Primary | ICD-10-CM

## 2021-07-27 LAB
ALBUMIN SERPL-MCNC: 3.6 G/DL (ref 3.5–5)
ALBUMIN/GLOB SERPL: 1.1 {RATIO} (ref 1.1–2.2)
ALP SERPL-CCNC: 77 U/L (ref 45–117)
ALT SERPL-CCNC: 15 U/L (ref 12–78)
ANION GAP SERPL CALC-SCNC: 5 MMOL/L (ref 5–15)
AST SERPL-CCNC: 16 U/L (ref 15–37)
ATRIAL RATE: 75 BPM
BILIRUB SERPL-MCNC: 0.4 MG/DL (ref 0.2–1)
BUN SERPL-MCNC: 14 MG/DL (ref 6–20)
BUN/CREAT SERPL: 10 (ref 12–20)
CALCIUM SERPL-MCNC: 8.9 MG/DL (ref 8.5–10.1)
CALCULATED P AXIS, ECG09: 33 DEGREES
CALCULATED R AXIS, ECG10: -3 DEGREES
CALCULATED T AXIS, ECG11: 27 DEGREES
CHLORIDE SERPL-SCNC: 108 MMOL/L (ref 97–108)
CO2 SERPL-SCNC: 28 MMOL/L (ref 21–32)
CREAT SERPL-MCNC: 1.39 MG/DL (ref 0.7–1.3)
DIAGNOSIS, 93000: NORMAL
ERYTHROCYTE [DISTWIDTH] IN BLOOD BY AUTOMATED COUNT: 13.7 % (ref 11.5–14.5)
GLOBULIN SER CALC-MCNC: 3.3 G/DL (ref 2–4)
GLUCOSE SERPL-MCNC: 85 MG/DL (ref 65–100)
HCT VFR BLD AUTO: 40.4 % (ref 36.6–50.3)
HGB BLD-MCNC: 13.5 G/DL (ref 12.1–17)
MCH RBC QN AUTO: 31.8 PG (ref 26–34)
MCHC RBC AUTO-ENTMCNC: 33.4 G/DL (ref 30–36.5)
MCV RBC AUTO: 95.1 FL (ref 80–99)
NRBC # BLD: 0 K/UL (ref 0–0.01)
NRBC BLD-RTO: 0 PER 100 WBC
P-R INTERVAL, ECG05: 156 MS
PLATELET # BLD AUTO: 256 K/UL (ref 150–400)
PMV BLD AUTO: 10.4 FL (ref 8.9–12.9)
POTASSIUM SERPL-SCNC: 3.9 MMOL/L (ref 3.5–5.1)
PROT SERPL-MCNC: 6.9 G/DL (ref 6.4–8.2)
Q-T INTERVAL, ECG07: 388 MS
QRS DURATION, ECG06: 92 MS
QTC CALCULATION (BEZET), ECG08: 433 MS
RBC # BLD AUTO: 4.25 M/UL (ref 4.1–5.7)
SODIUM SERPL-SCNC: 141 MMOL/L (ref 136–145)
VENTRICULAR RATE, ECG03: 75 BPM
WBC # BLD AUTO: 10.6 K/UL (ref 4.1–11.1)

## 2021-07-27 PROCEDURE — U0005 INFEC AGEN DETEC AMPLI PROBE: HCPCS

## 2021-07-27 PROCEDURE — 85027 COMPLETE CBC AUTOMATED: CPT

## 2021-07-27 PROCEDURE — 36415 COLL VENOUS BLD VENIPUNCTURE: CPT

## 2021-07-27 PROCEDURE — 93005 ELECTROCARDIOGRAM TRACING: CPT

## 2021-07-27 PROCEDURE — 80053 COMPREHEN METABOLIC PANEL: CPT

## 2021-07-28 LAB
SARS-COV-2, XPLCVT: NOT DETECTED
SOURCE, COVRS: NORMAL

## 2021-08-11 ENCOUNTER — HOSPITAL ENCOUNTER (OUTPATIENT)
Dept: CT IMAGING | Age: 56
Discharge: HOME | End: 2021-08-11
Attending: OTOLARYNGOLOGY
Payer: MEDICAID

## 2021-08-11 DIAGNOSIS — R22.1 NECK MASS: ICD-10-CM

## 2021-08-11 PROCEDURE — 74011000636 HC RX REV CODE- 636: Performed by: OTOLARYNGOLOGY

## 2021-08-11 PROCEDURE — 70491 CT SOFT TISSUE NECK W/DYE: CPT

## 2021-08-11 RX ADMIN — IOPAMIDOL 100 ML: 612 INJECTION, SOLUTION INTRAVENOUS at 09:21

## 2021-08-31 ENCOUNTER — TRANSCRIBE ORDER (OUTPATIENT)
Dept: REGISTRATION | Age: 56
End: 2021-08-31

## 2021-08-31 DIAGNOSIS — Z01.812 PRE-PROCEDURE LAB EXAM: Primary | ICD-10-CM

## 2023-05-17 RX ORDER — GUAIFENESIN 600 MG/1
600 TABLET, EXTENDED RELEASE ORAL 2 TIMES DAILY
COMMUNITY
Start: 2021-07-14

## 2023-05-17 RX ORDER — ALBUTEROL SULFATE 90 UG/1
2 AEROSOL, METERED RESPIRATORY (INHALATION) EVERY 6 HOURS PRN
COMMUNITY
Start: 2021-07-14

## 2023-05-17 RX ORDER — TRAZODONE HYDROCHLORIDE 50 MG/1
TABLET ORAL
COMMUNITY

## 2023-05-17 RX ORDER — AMLODIPINE BESYLATE 5 MG/1
5 TABLET ORAL DAILY
COMMUNITY
Start: 2021-03-22